# Patient Record
Sex: MALE | Race: WHITE | Employment: OTHER | URBAN - METROPOLITAN AREA
[De-identification: names, ages, dates, MRNs, and addresses within clinical notes are randomized per-mention and may not be internally consistent; named-entity substitution may affect disease eponyms.]

---

## 2017-10-23 DIAGNOSIS — I77.9 BILATERAL CAROTID ARTERY DISEASE (HCC): Primary | ICD-10-CM

## 2017-11-30 ENCOUNTER — HOSPITAL ENCOUNTER (OUTPATIENT)
Dept: VASCULAR SURGERY | Age: 72
Discharge: HOME | End: 2017-11-30
Attending: PSYCHIATRY & NEUROLOGY
Payer: MEDICARE

## 2017-11-30 DIAGNOSIS — I77.9 BILATERAL CAROTID ARTERY DISEASE (HCC): ICD-10-CM

## 2017-11-30 PROCEDURE — 93880 EXTRACRANIAL BILAT STUDY: CPT

## 2017-11-30 NOTE — PROCEDURES
[facility]  *** FINAL REPORT ***    Name: Keke Stewart  MRN: [id number]    Outpatient  : 31 Mar 1945  HIS Order #: [id number]  [id number] [facility] Visit #: [id number]  Date: 2017    TYPE OF TEST: Cerebrovascular Duplex    REASON FOR TEST  Carotid disease, F/U right ICA stent    Right Carotid:-             Proximal               Mid                 Distal  cm/s  Systolic  Diastolic  Systolic  Diastolic  Systolic  Diastolic  CCA:     88.6      23.0       87.0      25.0       72.0      31.0  Bulb:  ECA:    129.0      16.0  ICA:    275.0     149.0      281.0     140.0      138.0      59.0  ICA/CCA:  3.0       6.1    ICA Stenosis: 70% or greater    Right Vertebral:-  Finding: Occluded  Sys:        0.0  Maria Elena:        0.0    Right Subclavian: Normal    Left Carotid:-            Proximal                Mid                 Distal  cm/s  Systolic  Diastolic  Systolic  Diastolic  Systolic  Diastolic  CCA:     97.9      10.0       89.0      15.0       98.0      14.0  Bulb:  ECA:    214.0      28.0  ICA:      0.0       0.0        0.0       0.0        0.0       0.0  ICA/CCA:  0.0       0.0    ICA Stenosis: Occluded    Left Vertebral:-  Finding: Antegrade  Sys:       49.0  Maria Elena:       15.0    Left Subclavian: Normal    INTERPRETATION/FINDINGS  Duplex images were obtained using 2-D gray scale, color flow, and  spectral Doppler analysis. 1. 70% or greater stenosis in the right internal carotid artery. 2. 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 3. No significant stenosis in the external carotid arteries  bilaterally. 4. 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 5. Antegrade flow in the left vertebral artery. 6. Normal flow in both subclavian arteries. 7. No significant changes when compared to previous exam done on  16. Plaque Morphology:  1. Varying Density plaque in the bulb and right ICA. ADDITIONAL COMMENTS  Right CCA to ICA stent.     I have personally reviewed the data relevant to the interpretation of  this  study. TECHNOLOGIST: Wyatt Salazar. Moreno Daly  Signed: 11/30/2017 02:39 PM    PHYSICIAN: Grady Fitzgerald.  Ángel Moody MD  Signed: 11/30/2017 04:40 PM